# Patient Record
Sex: MALE | Race: BLACK OR AFRICAN AMERICAN | URBAN - METROPOLITAN AREA
[De-identification: names, ages, dates, MRNs, and addresses within clinical notes are randomized per-mention and may not be internally consistent; named-entity substitution may affect disease eponyms.]

---

## 2019-09-23 ENCOUNTER — OFFICE (OUTPATIENT)
Dept: URBAN - METROPOLITAN AREA CLINIC 78 | Facility: CLINIC | Age: 64
End: 2019-09-23
Payer: MEDICARE

## 2019-09-23 VITALS
DIASTOLIC BLOOD PRESSURE: 88 MMHG | TEMPERATURE: 97 F | WEIGHT: 179 LBS | HEIGHT: 67 IN | SYSTOLIC BLOOD PRESSURE: 128 MMHG | HEART RATE: 88 BPM

## 2019-09-23 DIAGNOSIS — R56.9 UNSPECIFIED CONVULSIONS: ICD-10-CM

## 2019-09-23 DIAGNOSIS — Z86.010 PERSONAL HISTORY OF COLONIC POLYPS: ICD-10-CM

## 2019-09-23 PROCEDURE — 99203 OFFICE O/P NEW LOW 30 MIN: CPT

## 2019-09-23 NOTE — SERVICEHPINOTES
GI MOBLEY   is a   64   year old male who is being seen in consultation at the request of   CLEMENCIA WINSLOW   for a colonoscopy. He doesn't know where or when his test was done but he states he had polyps removed. and he was told that he is due now for a colonoscopy. No current GI issues. 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 His last seizure was in April. In December 2018, he was diagnosed with stage four lung cancer w/mets to the brain follows w/Dr. El and has received the Cyberknife treatment he is in remission. He finished chemo in the past few months. No chest pain or SOB, states that he had normal stress test recently. No family hx of GI issues.

## 2019-11-20 ENCOUNTER — ON CAMPUS - OUTPATIENT (OUTPATIENT)
Dept: URBAN - METROPOLITAN AREA HOSPITAL 63 | Facility: HOSPITAL | Age: 64
End: 2019-11-20
Payer: MEDICARE

## 2019-11-20 DIAGNOSIS — Z86.010 PERSONAL HISTORY OF COLONIC POLYPS: ICD-10-CM

## 2019-11-20 DIAGNOSIS — K63.5 POLYP OF COLON: ICD-10-CM

## 2019-11-20 PROCEDURE — 45380 COLONOSCOPY AND BIOPSY: CPT | Mod: PT
